# Patient Record
Sex: MALE | Race: WHITE | NOT HISPANIC OR LATINO | Employment: UNEMPLOYED | URBAN - METROPOLITAN AREA
[De-identification: names, ages, dates, MRNs, and addresses within clinical notes are randomized per-mention and may not be internally consistent; named-entity substitution may affect disease eponyms.]

---

## 2021-06-02 ENCOUNTER — HOSPITAL ENCOUNTER (OUTPATIENT)
Dept: RADIOLOGY | Facility: MEDICAL CENTER | Age: 56
End: 2021-06-02
Attending: OTOLARYNGOLOGY
Payer: MEDICAID

## 2022-09-15 PROBLEM — R13.12 OROPHARYNGEAL DYSPHAGIA: Status: ACTIVE | Noted: 2021-12-20

## 2022-09-15 PROBLEM — C09.9 MALIGNANT NEOPLASM OF TONSIL (HCC): Status: ACTIVE | Noted: 2022-09-15

## 2022-09-15 PROBLEM — G52.8: Status: ACTIVE | Noted: 2022-09-15

## 2022-09-15 PROBLEM — C09.1: Status: ACTIVE | Noted: 2021-06-22

## 2022-09-15 PROBLEM — C06.9 SQUAMOUS CELL CARCINOMA OF ORAL CAVITY (HCC): Status: ACTIVE | Noted: 2021-12-20

## 2023-07-06 PROBLEM — R00.1 BRADYCARDIA: Status: ACTIVE | Noted: 2023-05-22

## 2023-07-06 PROBLEM — Z95.0 PRESENCE OF CARDIAC PACEMAKER: Status: ACTIVE | Noted: 2023-07-06

## 2024-04-29 ENCOUNTER — HOSPITAL ENCOUNTER (OUTPATIENT)
Dept: RADIOLOGY | Facility: MEDICAL CENTER | Age: 59
End: 2024-04-29
Attending: NEUROLOGICAL SURGERY
Payer: MEDICAID

## 2024-04-29 VITALS
SYSTOLIC BLOOD PRESSURE: 154 MMHG | HEART RATE: 74 BPM | RESPIRATION RATE: 18 BRPM | OXYGEN SATURATION: 98 % | DIASTOLIC BLOOD PRESSURE: 95 MMHG

## 2024-04-29 DIAGNOSIS — M47.812 CS (CERVICAL SPONDYLOSIS): ICD-10-CM

## 2024-04-29 DIAGNOSIS — M54.10 RADICULOPATHY OF ARM: ICD-10-CM

## 2024-04-29 PROCEDURE — 72141 MRI NECK SPINE W/O DYE: CPT

## 2024-04-29 NOTE — PROGRESS NOTES
Patient arrived to OP MRI for scan of cervical spine. Patient's pacemaker placed into MRI safe mode by [employer] pacer rep. Patient placed on table. BP cuff, SPO2, and EKG monitors applied. Patient given emergency squeeze ball and instructed to use should any chest pain, discomfort, burning, or shortness of breath arise. Patient verbalized understanding. Patient completed scan without issue. Patient's pacemaker placed back into MRI safe mode. Pacemaker summary scanned into chart.